# Patient Record
Sex: MALE | NOT HISPANIC OR LATINO | URBAN - METROPOLITAN AREA
[De-identification: names, ages, dates, MRNs, and addresses within clinical notes are randomized per-mention and may not be internally consistent; named-entity substitution may affect disease eponyms.]

---
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
## 2021-05-06 NOTE — ED PEDIATRIC TRIAGE NOTE - SOURCE OF INFORMATION
Patient arrived via EMS for c/o SHORTNESS OF AIR, diarrhea, cough weakness/tired, decreased appetite   Patient

## 2021-10-28 NOTE — ED PROVIDER NOTE - CONSTITUTIONAL, MLM
independent normal... Well appearing, well nourished, awake, alert, oriented to person, place, time/situation and in no apparent distress.

## 2024-06-23 NOTE — ED PROVIDER NOTE - TOBACCO USE
The patient denies symptoms of hyper or hypoglycemia, paresthesias, foot ulcers, vision loss and depression. Compliant with diet, exercise and medications. DM eye exam and urine microalbumin are UTD. Glucolog reviewed and control appears adequate. Exam is unchanged. No DM foot ulcers. CPM including home monitoring, diet, exercise, weight loss and routine foot care. Target LDL <70, triglycerides <150 and HDL>40. Further evaluation, treatment, and follow-up per orders, current med list, and patient instructions.     Never smoker

## 2025-02-23 ENCOUNTER — EMERGENCY (EMERGENCY)
Facility: HOSPITAL | Age: 25
LOS: 1 days | Discharge: HOME | End: 2025-02-23
Attending: STUDENT IN AN ORGANIZED HEALTH CARE EDUCATION/TRAINING PROGRAM | Admitting: STUDENT IN AN ORGANIZED HEALTH CARE EDUCATION/TRAINING PROGRAM
Payer: COMMERCIAL

## 2025-02-23 VITALS
TEMPERATURE: 98 F | OXYGEN SATURATION: 98 % | WEIGHT: 164.91 LBS | HEART RATE: 87 BPM | DIASTOLIC BLOOD PRESSURE: 80 MMHG | HEIGHT: 71 IN | SYSTOLIC BLOOD PRESSURE: 130 MMHG | RESPIRATION RATE: 16 BRPM

## 2025-02-23 PROCEDURE — 29515 APPLICATION SHORT LEG SPLINT: CPT | Mod: RT

## 2025-02-23 PROCEDURE — 99284 EMERGENCY DEPT VISIT MOD MDM: CPT | Mod: 25

## 2025-02-23 PROCEDURE — 73630 X-RAY EXAM OF FOOT: CPT

## 2025-02-23 PROCEDURE — 99283 EMERGENCY DEPT VISIT LOW MDM: CPT | Mod: 25

## 2025-02-23 PROCEDURE — 73630 X-RAY EXAM OF FOOT: CPT | Mod: 26,RT

## 2025-02-23 NOTE — ED PROVIDER NOTE - PHYSICAL EXAMINATION
Constitutional: Awake, Alert, non-toxic. NAD. Well appearing, well nourished.   HEAD: Normocephalic, atraumatic.   EYES: EOM intact, conjunctiva and sclera are clear bilaterally.   ENT: No rhinorrhea, patent, mucous membranes pink/moist, no drooling or stridor.   NECK: Supple, non-tender  RESPIRATORY: Normal respiratory effort  EXTREMITIES: Full passive and active ROM in all extremities; (+) right ankle/foot lateral TTP, no swelling or ecchymosis, proximal tib/fib non-tender to palpation; distal pulses palpable and symmetric  SKIN: Warm, dry; good skin turgor, no apparent lesions or rashes, no ecchymosis, brisk capillary refill.  NEURO: A&O x3. Sensory and motor functions are grossly intact. Speech is normal. Appearance and judgement seem appropriate for gender and age.

## 2025-02-23 NOTE — ED PROVIDER NOTE - OBJECTIVE STATEMENT
23 y/o male without reported PMHX presents today due to right foot injury. Pt reports he was going down the steps carrying a box in which he missed a step. pt hyper plantar flexed his foot. Pt describes pain to lateral foot/ankle as aching, non-radiating and currently 6/10. pt denies head injury, LOC, laceration, numbness/weakness, or any other complaints.

## 2025-02-23 NOTE — ED ADULT NURSE NOTE - NSFALLUNIVINTERV_ED_ALL_ED
Bed/Stretcher in lowest position, wheels locked, appropriate side rails in place/Call bell, personal items and telephone in reach/Instruct patient to call for assistance before getting out of bed/chair/stretcher/Non-slip footwear applied when patient is off stretcher/[place] to call system/Physically safe environment - no spills, clutter or unnecessary equipment/Purposeful proactive rounding/Room/bathroom lighting operational, light cord in reach

## 2025-02-23 NOTE — ED PROVIDER NOTE - PATIENT PORTAL LINK FT
You can access the FollowMyHealth Patient Portal offered by [facility] by registering at the following website: [email or web address]. By joining [facility]’s FollowMyHealth portal, you will also be able to view your health information using other applications (apps) compatible with our system.

## 2025-02-23 NOTE — ED PROVIDER NOTE - CARE PROVIDER_API CALL
Rene Dan  Podiatric Medicine and Surgery  [address] [place] [address]  Phone: [phone]  Fax: [phone]  Follow Up Time: 1-3 Days

## 2025-02-23 NOTE — ED ADULT NURSE NOTE - OBJECTIVE STATEMENT
pt states that he injured his right ankle last night. pt denies hitting his head. pt in no acute distress. pt updated on plan of care

## 2025-02-23 NOTE — ED PROVIDER NOTE - NSFOLLOWUPINSTRUCTIONS_ED_ALL_ED_FT
1) Follow-up with Podiatry, See referred doctor. Call today/next business day for close, prompt follow-up.  2) Return to Emergency room for any worsening or persistent pain, weakness, numbness, fever, color change to extremity, or any other concerning symptoms.  3) Take ibuprofen 600 mg every  6 hours as needed.   4) You can consider discussing with your doctor if physical therapy or further imaging as an MRI may be beneficial.     Foot Sprain    WHAT YOU NEED TO KNOW:    What is a foot sprain? A foot sprain happens when a ligament stretches or tears. Ligaments connect bones, support joints, and keep bones in place.    What are the signs and symptoms of a foot sprain?    Trouble moving your ankle or foot    Pain when you touch or put weight on your foot    Bruised, swollen, tender, or misshapen foot  How is a foot sprain diagnosed? Your healthcare provider will ask about your injury and examine you. Your provider will check the movement and strength of your foot. Contrast liquid may be given before x-ray or MRI pictures are taken. Tell a healthcare provider if you have ever had an allergic reaction to contrast liquid.    X-ray pictures may show the sprain or an injury in your foot.    An MRI may show what is causing the sprain. Do not enter the MRI room with anything metal. The MRI uses a powerful magnet. Metal can cause serious injury from the magnet. Tell a healthcare provider if you have any metal in or on your body.  How is a foot sprain treated?    Support devices, such as a brace, cast, or splint, may be needed to limit your movement and protect your joint. You may need to use crutches to decrease your pain as you move around.    NSAIDs, such as ibuprofen, help decrease swelling, pain, and fever. This medicine is available with or without a doctor's order. NSAIDs can cause stomach bleeding or kidney problems in certain people. If you take blood thinner medicine, always ask your healthcare provider if NSAIDs are safe for you. Always read the medicine label and follow directions.  How can I manage my foot sprain?    Rest your foot. Limit movement in your sprained foot for the first 2 to 3 days. You may need crutches to take weight off your injured foot as it heals. Use crutches as directed.  Walking with Crutches      Apply ice on your foot to decrease swelling and pain. Use an ice pack, or put crushed ice in a plastic bag. Cover the bag with a towel before you apply it to your foot. Apply ice for 15 to 20 minutes every hour or as directed.    Compress your foot. You may need to use tape or an elastic bandage to support your foot if you have a mild sprain. You may need a splint on your foot for support if your sprain is severe. Wear your splint for as many days as directed.    Elevate your foot above the level of your heart as often as you can. This will help decrease swelling and pain. Prop your foot on pillows or blankets to keep it elevated comfortably.  Elevate Leg      Go to physical therapy, if directed. A physical therapist can teach you exercises to help restore strength and increase the range of motion in your foot. Ask your provider when you can return to your normal activities or play sports.  How can I prevent another foot sprain?    Warm up and stretch before you exercise or play sports. This helps your joints become strong and flexible.  Warm up and Cool Down       Use the right equipment. Always wear shoes that fit well and are made for the activity that you are doing. You may also need ankle supports, elbow and knee pads, or braces.  When should I seek immediate care?    You have numbness or tingling below the injury, such as in your toes.    The skin on your injured foot is blue or pale.    You have increased pain, even after you take pain medicine.  When should I call my doctor?    You have new weakness in your foot.    You have new or increased swelling in your foot.    You have new or increased stiffness when you move your injured foot.    You have questions or concerns about your condition or care.  CARE AGREEMENT:    You have the right to help plan your care. Learn about your health condition and how it may be treated. Discuss treatment options with your healthcare providers to decide what care you want to receive. You always have the right to refuse treatment.

## 2025-02-23 NOTE — ED PROVIDER NOTE - CLINICAL SUMMARY MEDICAL DECISION MAKING FREE TEXT BOX
I, Percy Rich MD, have seen and examined the patient on the date of service.  I agree with the EBONY's assessment as written, with exceptions or additions as noted below or in a separate note.  Patient with no significant past medical history is presenting with right foot pain.  States that he rolled his ankle yesterday on steps and felt a pop.  Denies other injury.  States pain with ambulation.  States that he recently sprained this ankle a few weeks prior.  On exam he has tenderness palpation over the dorsum of the foot located over the fourth and fifth metatarsal.  No localized fifth metatarsal point tenderness.  No medial or lateral malleoli tenderness.  X-ray per my independent interpretation does not show evidence of fracture.  Will place in splint given pain, give crutches and follow-up with podiatry.